# Patient Record
Sex: FEMALE | Race: BLACK OR AFRICAN AMERICAN | NOT HISPANIC OR LATINO | URBAN - METROPOLITAN AREA
[De-identification: names, ages, dates, MRNs, and addresses within clinical notes are randomized per-mention and may not be internally consistent; named-entity substitution may affect disease eponyms.]

---

## 2022-05-22 ENCOUNTER — EMERGENCY (EMERGENCY)
Facility: HOSPITAL | Age: 42
LOS: 0 days | Discharge: HOME | End: 2022-05-22
Attending: EMERGENCY MEDICINE
Payer: SELF-PAY

## 2022-05-22 VITALS
HEART RATE: 95 BPM | DIASTOLIC BLOOD PRESSURE: 78 MMHG | SYSTOLIC BLOOD PRESSURE: 116 MMHG | TEMPERATURE: 98 F | RESPIRATION RATE: 18 BRPM | OXYGEN SATURATION: 97 %

## 2022-05-22 VITALS
WEIGHT: 235.01 LBS | SYSTOLIC BLOOD PRESSURE: 122 MMHG | DIASTOLIC BLOOD PRESSURE: 79 MMHG | HEIGHT: 64 IN | HEART RATE: 100 BPM | TEMPERATURE: 98 F | OXYGEN SATURATION: 98 % | RESPIRATION RATE: 16 BRPM

## 2022-05-22 DIAGNOSIS — S46.912A STRAIN OF UNSPECIFIED MUSCLE, FASCIA AND TENDON AT SHOULDER AND UPPER ARM LEVEL, LEFT ARM, INITIAL ENCOUNTER: ICD-10-CM

## 2022-05-22 DIAGNOSIS — M25.522 PAIN IN LEFT ELBOW: ICD-10-CM
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
## 2022-05-22 DIAGNOSIS — M79.602 PAIN IN LEFT ARM: ICD-10-CM

## 2022-05-22 PROCEDURE — 99283 EMERGENCY DEPT VISIT LOW MDM: CPT

## 2022-05-22 RX ORDER — ACETAMINOPHEN 500 MG
975 TABLET ORAL ONCE
Refills: 0 | Status: COMPLETED | OUTPATIENT
Start: 2022-05-22 | End: 2022-05-22

## 2022-05-22 RX ORDER — IBUPROFEN 200 MG
1 TABLET ORAL
Qty: 15 | Refills: 0
Start: 2022-05-22 | End: 2022-05-26

## 2022-05-22 RX ORDER — CYCLOBENZAPRINE HYDROCHLORIDE 10 MG/1
1 TABLET, FILM COATED ORAL
Qty: 15 | Refills: 0
Start: 2022-05-22 | End: 2022-05-26

## 2022-05-22 RX ADMIN — Medication 975 MILLIGRAM(S): at 18:19

## 2022-05-22 NOTE — ED ADULT TRIAGE NOTE - CHIEF COMPLAINT QUOTE
pt c/o pain and swelling left arm denies injury + distal pulse, + sensation, + cap refeill less than 2 sec ring removed from left 4th finger due to swelling

## 2022-05-22 NOTE — ED PROVIDER NOTE - OBJECTIVE STATEMENT
Ordered    Orders Placed This Encounter   â¢ acetaminophen-codeine (TYLENOL NO.2) 300-15 MG per tablet     Sig: Take 1-2 tablets by mouth 2 times daily as needed for Pain.      Dispense:  20 tablet     Refill:  0 41 years old female walked in c/o left arm pain increases to extend the left elbow and lift left arm since this morning. Pt sts she woke up with the pain and she was moving old junks from her house yesterday. Pt denies recent hx of trauma, headache, dizziness, blurred visions, light sensitivities, focal/distal weakness or numbness, cough, sob, chest pain, nausea, vomiting, fever, chills, abd pan, dysuria, vaginal spotting or discharge or irregular bowel movements. Pt sts she is not at risk of being pregnant.

## 2022-05-22 NOTE — ED ADULT NURSE NOTE - OBJECTIVE STATEMENT
Pt presents to the ED complaining of sharp L arm pain. States that pain started yesterday where she felt her began to get swollen. Denies any extraneous activity. pt states menstrual is irregular and LMP was a few months ago. Denies cp, sob, diff breathing, BC. Denies any pertinent medical hx

## 2022-05-22 NOTE — ED PROVIDER NOTE - PATIENT PORTAL LINK FT
You can access the FollowMyHealth Patient Portal offered by [facility] by registering at the following website: [email or web address]. By joining [facility]’s FollowMyHealth portal, you will also be able to view your health information using other applications (apps) compatible with our system.

## 2022-05-22 NOTE — ED PROVIDER NOTE - VASCULAR COMPROMISE
equal bilateral radial pulse, cap refill < 2 sec, same tactile temp to touch bilateral arms/pulses full and equal bilaterally
